# Patient Record
Sex: FEMALE | Race: WHITE | NOT HISPANIC OR LATINO | Employment: OTHER | URBAN - METROPOLITAN AREA
[De-identification: names, ages, dates, MRNs, and addresses within clinical notes are randomized per-mention and may not be internally consistent; named-entity substitution may affect disease eponyms.]

---

## 2020-11-12 ENCOUNTER — OFFICE VISIT (OUTPATIENT)
Dept: ORTHOPEDIC SURGERY | Facility: CLINIC | Age: 65
End: 2020-11-12

## 2020-11-12 VITALS — HEIGHT: 66 IN | BODY MASS INDEX: 21.86 KG/M2 | TEMPERATURE: 96 F | WEIGHT: 136 LBS

## 2020-11-12 DIAGNOSIS — M25.561 PAIN IN BOTH KNEES, UNSPECIFIED CHRONICITY: Primary | ICD-10-CM

## 2020-11-12 DIAGNOSIS — M25.562 PAIN IN BOTH KNEES, UNSPECIFIED CHRONICITY: Primary | ICD-10-CM

## 2020-11-12 DIAGNOSIS — M17.0 PRIMARY OSTEOARTHRITIS OF BOTH KNEES: ICD-10-CM

## 2020-11-12 PROCEDURE — 99204 OFFICE O/P NEW MOD 45 MIN: CPT | Performed by: ORTHOPAEDIC SURGERY

## 2020-11-12 PROCEDURE — 73562 X-RAY EXAM OF KNEE 3: CPT | Performed by: ORTHOPAEDIC SURGERY

## 2020-11-12 RX ORDER — ZOLPIDEM TARTRATE 10 MG/1
TABLET ORAL
COMMUNITY
Start: 2014-03-10

## 2020-11-12 RX ORDER — ESTRADIOL 0.5 MG/1
0.5 TABLET ORAL DAILY
COMMUNITY
Start: 2014-03-10

## 2020-11-12 RX ORDER — CHLORAL HYDRATE 500 MG
CAPSULE ORAL
COMMUNITY

## 2020-11-12 RX ORDER — LEVOTHYROXINE SODIUM 0.05 MG/1
TABLET ORAL DAILY
COMMUNITY
Start: 2014-03-10

## 2020-11-12 RX ORDER — CETIRIZINE HYDROCHLORIDE 5 MG/1
5 TABLET ORAL DAILY
COMMUNITY

## 2020-11-12 RX ORDER — CLOPIDOGREL BISULFATE 75 MG/1
TABLET ORAL
COMMUNITY
Start: 2014-03-10

## 2020-11-12 NOTE — PROGRESS NOTES
"willowPatient: Kathryn Saxena  YOB: 1955 65 y.o. female  Medical Record Number: [id number]    Chief Complaints:   Chief Complaint   Patient presents with   • Left Knee - Establish Care, Pain   • Right Knee - Establish Care, Pain       History of Present Illness:Kathryn Saxena is a 65 y.o. female who presents with bilateral knee pain severe in nature it has markedly worsened over the last few years.  She works as a  at [employer] and is very active.  She has become limited in any squatting or knee flexion activities.  She is having difficulty with stairs.  She used to run she is no longer able to run.  She has a severe anterior ache with any knee flexion.  Had injections a few weeks ago with some mild relief.  Cannot take anti-inflammatories due to Plavix.    Allergies: No Known Allergies    Medications:   Current Outpatient Medications   Medication Sig Dispense Refill   • cetirizine (zyrTEC) 5 MG tablet Take 5 mg by mouth Daily.     • estradiol (ESTRACE) 0.5 MG tablet Take 0.5 mg by mouth Daily.     • levothyroxine (Synthroid) 50 MCG tablet Take  by mouth Daily.     • zolpidem (Ambien) 10 MG tablet Take  by mouth.     • clopidogrel (Plavix) 75 MG tablet Take  by mouth.     • Omega-3 Fatty Acids (fish oil) 1000 MG capsule capsule Take  by mouth.       No current facility-administered medications for this visit.          The following portions of the patient's history were reviewed and updated as appropriate: allergies, current medications, past family history, past medical history, past social history, past surgical history and problem list.    Review of Systems:   A 14 point review of systems was performed. All systems negative except pertinent positives/negative listed in HPI above    Physical Exam:   Vitals:    11/12/20 0822   Temp: 96 °F (35.6 °C)   TempSrc: Temporal   Weight: 61.7 kg (136 lb)   Height: 167.6 cm (66\")       General: A and O x 3, ASA, NAD    SCLERA:    Normal    DENTITION:   " Normal  Knee:  bilateral    ALIGNMENT:     Varus  ,   Patella  tracks  Midline to lateral with crepitance    GAIT:    Antalgic    SKIN:    No abnormality    RANGE OF MOTION:   2  -  125   DEG    STRENGTH:   4+  / 5    LIGAMENTS:    No varus / valgus instability.   Negative  Lachman.    MENISCUS:     Negative   Eben       DISTAL PULSES:    Paplable    DISTAL SENSATION :   Intact    LYMPHATICS:     No   lymphadenopathy    OTHER:          - Positive  Trace  effusion      - Crepitance with ROM          Radiology:  Xrays 3views both knees (ap,lateral, sunrise) were ordered and reviewed for evaluation of knee pain demonstrating advanced varus osteoarthritis with bone on bone articulation, subchondral cysts, and periarticular osteophytes. There are no previous films for comparision.    Assessment/Plan: Bilateral knee advanced end-stage patellofemoral osteoarthritis.  We discussed joint replacement surgery.  I explained there are some limitations in active person like her self.  I would not expect her to be running jumping kneeling or squatting on a regular basis.  Continuation of conservative management vs. TKA discussed.  The patient wishes to proceed with total knee replacement.  At this point the patient has failed the full compliment of conservative treatment and stating complete understanding of the risks/benefits/ anternatives wishes to proceed with surgical treatment.    Risk and benefits of surgery were reviewed.  Including, but not limited to, blood clots or pulmonary embolism, anesthesia risk, infection, fracture, skin/leg numbness, persistent pain/crepitance/popping/catching, failure of the implant, need for future surgeries, hematoma, possible nerve or blood vessel injury, need for transfusion, and potential risk of stroke,heart attack or death, among others.  The patient understands and wishes to proceed.     It was explained that if tissue has been repaired or reconstructed, there is also an increased  chance of failure which may require further management.  Following the completion of the discussion, the patient expressed understanding of this planned course of care, all their questions were answered and consent will be obtained preoperatively.    Operative Plan: left  [facility] Total Knee Replacement an overnight staywith home health rehab-she will call to schedule        Shailesh Wheatley MD  11/12/2020

## 2025-06-13 ENCOUNTER — OFFICE VISIT (OUTPATIENT)
Dept: ORTHOPEDIC SURGERY | Facility: CLINIC | Age: 70
End: 2025-06-13
Payer: MEDICARE

## 2025-06-13 VITALS — WEIGHT: 133.2 LBS | TEMPERATURE: 98.2 F | BODY MASS INDEX: 21.41 KG/M2 | HEIGHT: 66 IN

## 2025-06-13 DIAGNOSIS — R52 PAIN: Primary | ICD-10-CM

## 2025-06-13 DIAGNOSIS — M17.11 PRIMARY OSTEOARTHRITIS OF RIGHT KNEE: ICD-10-CM

## 2025-06-13 RX ORDER — METHYLPREDNISOLONE ACETATE 80 MG/ML
80 INJECTION, SUSPENSION INTRA-ARTICULAR; INTRALESIONAL; INTRAMUSCULAR; SOFT TISSUE
Status: COMPLETED | OUTPATIENT
Start: 2025-06-13 | End: 2025-06-13

## 2025-06-13 RX ORDER — LIDOCAINE HYDROCHLORIDE 10 MG/ML
5 INJECTION, SOLUTION EPIDURAL; INFILTRATION; INTRACAUDAL; PERINEURAL
Status: COMPLETED | OUTPATIENT
Start: 2025-06-13 | End: 2025-06-13

## 2025-06-13 RX ORDER — MINOXIDIL 2.5 MG/1
2.5 TABLET ORAL DAILY
COMMUNITY
Start: 2025-05-05 | End: 2025-08-03

## 2025-06-13 RX ADMIN — LIDOCAINE HYDROCHLORIDE 5 ML: 10 INJECTION, SOLUTION EPIDURAL; INFILTRATION; INTRACAUDAL; PERINEURAL at 11:51

## 2025-06-13 RX ADMIN — METHYLPREDNISOLONE ACETATE 80 MG: 80 INJECTION, SUSPENSION INTRA-ARTICULAR; INTRALESIONAL; INTRAMUSCULAR; SOFT TISSUE at 11:51

## 2025-06-13 NOTE — PROGRESS NOTES
Patient: Shelly Saxena  YOB: 1955 69 y.o. female  Medical Record Number: [id number]    Chief Complaints:   Chief Complaint   Patient presents with    Right Knee - Establish Care, Initial Evaluation, Pain       History of Present Illness:Shelly Saxena is a 69 y.o. female who presents for follow-up of right knee pain that is chronic in nature.  Patient previously saw Dr. Wheatley in November 2020.  Patient was diagnosed with advanced knee osteoarthritis and was given an intra-articular joint injection and told her that she would eventually need an arthroplasty intervention.  Patient states that she has been can doing conservative measures ever since.  She states recently her symptoms began flaring up shortly after the [place].  Pain note noted globally to the anterior portion of the knee worse with full extension and deep flexion.  She denies any recent fall injury or trauma.  She does state that her knee will occasionally pop/click.  Denies any instability issues where her knee wants to buckle.  She is ambulating full weightbearing walks unassisted in clinic today.    Allergies:   Allergies   Allergen Reactions    Penicillins Rash       Medications:   Current Outpatient Medications   Medication Sig Dispense Refill    cetirizine (zyrTEC) 5 MG tablet Take 1 tablet by mouth Daily.      estradiol (ESTRACE) 0.5 MG tablet Take 1 tablet by mouth Daily.      levothyroxine (Synthroid) 50 MCG tablet Take  by mouth Daily.      minoxidil (LONITEN) 2.5 MG tablet Take 1 tablet by mouth Daily.      zolpidem (Ambien) 10 MG tablet Take  by mouth.       No current facility-administered medications for this visit.         The following portions of the patient's history were reviewed and updated as appropriate: allergies, current medications, past family history, past medical history, past social history, past surgical history and problem list.    Review of Systems:   Pertinent positives/negative listed in HPI  "above    Physical Exam:   Vitals:    06/13/25 0903   Temp: 98.2 °F (36.8 °C)   TempSrc: Temporal   Weight: 60.4 kg (133 lb 3.2 oz)   Height: 167.6 cm (65.98\")   PainSc: 5    PainLoc: Knee       General: A and O x 3, ASA, NAD      Knee Exam List: Knee:  right    ALIGNMENT:     Valgus      GAIT:    Antalgic    SKIN:    No abnormality    RANGE OF MOTION:   5  -  105   DEG    STRENGTH:   4  / 5    LIGAMENTS:    No varus / valgus instability.   Negative  Lachman.    MENISCUS:     Negative   Eben       DISTAL PULSES:    Paplable    DISTAL SENSATION :   Intact    LYMPHATICS:     No   lymphadenopathy    OTHER:          - Positive   effusion      - Crepitance with ROM     Radiology:  Xrays 3views right knee (ap,lateral, sunrise) were ordered and reviewed for evaluation of knee pain demonstrating advanced valgus and patellofemoral osteoarthritis with bone on bone articulation, subchondral cysts, and periarticular osteophytes.  Today's x-ray were compared with previous films which show further arthritic progression.      Assessment/Plan: Primary osteoarthritis right knee    Treatment option as well as imaging results were discussed in detail with the patient.  At this point in time patient would still like to proceed forward with conservative measures.  I have instructed her to continue on with quad strengthening and range of motion exercises.  I did educate her on the RICE method to help with inflammation and swelling.  Patient has responded well to previous intra-articular joint injection which will give her another 1 today as well.  She can follow back up with us as needed.    Large Joint Arthrocentesis: R knee  Date/Time: 6/13/2025 11:51 AM  Consent given by: patient  Site marked: site marked  Timeout: Immediately prior to procedure a time out was called to verify the correct patient, procedure, equipment, support staff and site/side marked as required   Supporting Documentation  Indications: pain and joint swelling "   Procedure Details  Location: knee - R knee  Preparation: Patient was prepped and draped in the usual sterile fashion  Needle size: 22 G  Approach: anterolateral  Medications administered: 80 mg methylPREDNISolone acetate 80 MG/ML; 5 mL lidocaine PF 1% 1 %  Patient tolerance: patient tolerated the procedure well with no immediate complications    (3 mL of lidocaine was used for anesthetic purposes)    MAKAYLA Wells  6/13/2025

## 2025-06-25 ENCOUNTER — TELEPHONE (OUTPATIENT)
Dept: ORTHOPEDIC SURGERY | Facility: CLINIC | Age: 70
End: 2025-06-25
Payer: MEDICARE

## 2025-06-30 ENCOUNTER — TELEPHONE (OUTPATIENT)
Dept: ORTHOPEDIC SURGERY | Facility: CLINIC | Age: 70
End: 2025-06-30

## 2025-06-30 ENCOUNTER — PATIENT MESSAGE (OUTPATIENT)
Dept: ORTHOPEDIC SURGERY | Facility: CLINIC | Age: 70
End: 2025-06-30
Payer: MEDICARE

## 2025-06-30 NOTE — TELEPHONE ENCOUNTER
Provider: RYAN ARREOLA    Caller: Shelly Saxena    Relationship to Patient: Self    Phone Number: [phone]    Reason for Call:PATIENT CALLED WANTING TO GIVE CONSENT FOR DR. KELL CAGE TO  X-RAY DISC ON HER BEHALF TODAY. ATTEMPTED TO REACH OFFICE, UNABLE TO WT. PLEASE ADVISE.

## 2025-07-01 ENCOUNTER — APPOINTMENT (OUTPATIENT)
Dept: GENERAL RADIOLOGY | Facility: HOSPITAL | Age: 70
End: 2025-07-01
Payer: MEDICARE

## 2025-07-01 ENCOUNTER — HOSPITAL ENCOUNTER (EMERGENCY)
Facility: HOSPITAL | Age: 70
Discharge: OTHER INSTITUTION | End: 2025-07-01
Attending: EMERGENCY MEDICINE | Admitting: EMERGENCY MEDICINE
Payer: MEDICARE

## 2025-07-01 VITALS
SYSTOLIC BLOOD PRESSURE: 112 MMHG | TEMPERATURE: 98.9 F | DIASTOLIC BLOOD PRESSURE: 59 MMHG | RESPIRATION RATE: 16 BRPM | OXYGEN SATURATION: 97 % | HEART RATE: 90 BPM

## 2025-07-01 DIAGNOSIS — M79.89 SWELLING OF RIGHT HAND: ICD-10-CM

## 2025-07-01 DIAGNOSIS — L03.113 CELLULITIS OF RIGHT ARM: Primary | ICD-10-CM

## 2025-07-01 LAB
ANION GAP SERPL CALCULATED.3IONS-SCNC: 16 MMOL/L (ref 5–15)
APTT PPP: 25.2 SECONDS (ref 22.7–35.4)
BASOPHILS # BLD AUTO: 0.03 10*3/MM3 (ref 0–0.2)
BASOPHILS NFR BLD AUTO: 0.2 % (ref 0–1.5)
BUN SERPL-MCNC: 22 MG/DL (ref 8–23)
BUN/CREAT SERPL: 20.4 (ref 7–25)
CALCIUM SPEC-SCNC: 9.2 MG/DL (ref 8.6–10.5)
CHLORIDE SERPL-SCNC: 98 MMOL/L (ref 98–107)
CK SERPL-CCNC: 143 U/L (ref 20–180)
CO2 SERPL-SCNC: 18 MMOL/L (ref 22–29)
CREAT SERPL-MCNC: 1.08 MG/DL (ref 0.57–1)
D-LACTATE SERPL-SCNC: 2.1 MMOL/L (ref 0.5–2)
DEPRECATED RDW RBC AUTO: 39.7 FL (ref 37–54)
EGFRCR SERPLBLD CKD-EPI 2021: 55.4 ML/MIN/1.73
EOSINOPHIL # BLD AUTO: 0 10*3/MM3 (ref 0–0.4)
EOSINOPHIL NFR BLD AUTO: 0 % (ref 0.3–6.2)
ERYTHROCYTE [DISTWIDTH] IN BLOOD BY AUTOMATED COUNT: 12.2 % (ref 12.3–15.4)
GLUCOSE SERPL-MCNC: 103 MG/DL (ref 65–99)
HCT VFR BLD AUTO: 43.5 % (ref 34–46.6)
HGB BLD-MCNC: 14.5 G/DL (ref 12–15.9)
IMM GRANULOCYTES # BLD AUTO: 0.14 10*3/MM3 (ref 0–0.05)
IMM GRANULOCYTES NFR BLD AUTO: 1 % (ref 0–0.5)
INR PPP: 1.04 (ref 0.9–1.1)
LYMPHOCYTES # BLD AUTO: 0.2 10*3/MM3 (ref 0.7–3.1)
LYMPHOCYTES NFR BLD AUTO: 1.4 % (ref 19.6–45.3)
MCH RBC QN AUTO: 29.7 PG (ref 26.6–33)
MCHC RBC AUTO-ENTMCNC: 33.3 G/DL (ref 31.5–35.7)
MCV RBC AUTO: 89 FL (ref 79–97)
MONOCYTES # BLD AUTO: 0.67 10*3/MM3 (ref 0.1–0.9)
MONOCYTES NFR BLD AUTO: 4.7 % (ref 5–12)
NEUTROPHILS NFR BLD AUTO: 13.36 10*3/MM3 (ref 1.7–7)
NEUTROPHILS NFR BLD AUTO: 92.7 % (ref 42.7–76)
NRBC BLD AUTO-RTO: 0 /100 WBC (ref 0–0.2)
PLATELET # BLD AUTO: 225 10*3/MM3 (ref 140–450)
PMV BLD AUTO: 10.1 FL (ref 6–12)
POTASSIUM SERPL-SCNC: 4 MMOL/L (ref 3.5–5.2)
PROCALCITONIN SERPL-MCNC: 10.4 NG/ML (ref 0–0.25)
PROTHROMBIN TIME: 13.5 SECONDS (ref 11.7–14.2)
RBC # BLD AUTO: 4.89 10*6/MM3 (ref 3.77–5.28)
SODIUM SERPL-SCNC: 132 MMOL/L (ref 136–145)
WBC NRBC COR # BLD AUTO: 14.4 10*3/MM3 (ref 3.4–10.8)

## 2025-07-01 PROCEDURE — 85025 COMPLETE CBC W/AUTO DIFF WBC: CPT | Performed by: EMERGENCY MEDICINE

## 2025-07-01 PROCEDURE — 96365 THER/PROPH/DIAG IV INF INIT: CPT

## 2025-07-01 PROCEDURE — 82550 ASSAY OF CK (CPK): CPT | Performed by: EMERGENCY MEDICINE

## 2025-07-01 PROCEDURE — 25010000002 CEFTRIAXONE PER 250 MG: Performed by: EMERGENCY MEDICINE

## 2025-07-01 PROCEDURE — 25010000002 FENTANYL CITRATE (PF) 50 MCG/ML SOLUTION: Performed by: EMERGENCY MEDICINE

## 2025-07-01 PROCEDURE — 25010000002 ONDANSETRON PER 1 MG: Performed by: EMERGENCY MEDICINE

## 2025-07-01 PROCEDURE — 96375 TX/PRO/DX INJ NEW DRUG ADDON: CPT

## 2025-07-01 PROCEDURE — 36415 COLL VENOUS BLD VENIPUNCTURE: CPT

## 2025-07-01 PROCEDURE — 99285 EMERGENCY DEPT VISIT HI MDM: CPT

## 2025-07-01 PROCEDURE — 85610 PROTHROMBIN TIME: CPT | Performed by: EMERGENCY MEDICINE

## 2025-07-01 PROCEDURE — 83605 ASSAY OF LACTIC ACID: CPT | Performed by: EMERGENCY MEDICINE

## 2025-07-01 PROCEDURE — 80048 BASIC METABOLIC PNL TOTAL CA: CPT | Performed by: EMERGENCY MEDICINE

## 2025-07-01 PROCEDURE — 36415 COLL VENOUS BLD VENIPUNCTURE: CPT | Performed by: EMERGENCY MEDICINE

## 2025-07-01 PROCEDURE — 25010000002 LINEZOLID 600 MG/300ML SOLUTION: Performed by: EMERGENCY MEDICINE

## 2025-07-01 PROCEDURE — 84145 PROCALCITONIN (PCT): CPT | Performed by: EMERGENCY MEDICINE

## 2025-07-01 PROCEDURE — 87040 BLOOD CULTURE FOR BACTERIA: CPT | Performed by: EMERGENCY MEDICINE

## 2025-07-01 PROCEDURE — 73130 X-RAY EXAM OF HAND: CPT

## 2025-07-01 PROCEDURE — 85730 THROMBOPLASTIN TIME PARTIAL: CPT | Performed by: EMERGENCY MEDICINE

## 2025-07-01 PROCEDURE — 25010000002 HYDROMORPHONE PER 4 MG: Performed by: EMERGENCY MEDICINE

## 2025-07-01 RX ORDER — ONDANSETRON 2 MG/ML
4 INJECTION INTRAMUSCULAR; INTRAVENOUS ONCE
Status: COMPLETED | OUTPATIENT
Start: 2025-07-01 | End: 2025-07-01

## 2025-07-01 RX ORDER — HYDROMORPHONE HYDROCHLORIDE 1 MG/ML
0.5 INJECTION, SOLUTION INTRAMUSCULAR; INTRAVENOUS; SUBCUTANEOUS ONCE
Refills: 0 | Status: COMPLETED | OUTPATIENT
Start: 2025-07-01 | End: 2025-07-01

## 2025-07-01 RX ORDER — FENTANYL CITRATE 50 UG/ML
100 INJECTION, SOLUTION INTRAMUSCULAR; INTRAVENOUS ONCE
Refills: 0 | Status: COMPLETED | OUTPATIENT
Start: 2025-07-01 | End: 2025-07-01

## 2025-07-01 RX ORDER — SODIUM CHLORIDE 0.9 % (FLUSH) 0.9 %
10 SYRINGE (ML) INJECTION AS NEEDED
Status: DISCONTINUED | OUTPATIENT
Start: 2025-07-01 | End: 2025-07-01 | Stop reason: HOSPADM

## 2025-07-01 RX ORDER — LINEZOLID 2 MG/ML
600 INJECTION, SOLUTION INTRAVENOUS ONCE
Status: COMPLETED | OUTPATIENT
Start: 2025-07-01 | End: 2025-07-01

## 2025-07-01 RX ADMIN — ONDANSETRON 4 MG: 2 INJECTION, SOLUTION INTRAMUSCULAR; INTRAVENOUS at 07:46

## 2025-07-01 RX ADMIN — LINEZOLID 600 MG: 600 INJECTION, SOLUTION INTRAVENOUS at 10:08

## 2025-07-01 RX ADMIN — CEFTRIAXONE SODIUM 2000 MG: 2 INJECTION, POWDER, FOR SOLUTION INTRAMUSCULAR; INTRAVENOUS at 08:58

## 2025-07-01 RX ADMIN — HYDROMORPHONE HYDROCHLORIDE 0.5 MG: 1 INJECTION, SOLUTION INTRAMUSCULAR; INTRAVENOUS; SUBCUTANEOUS at 10:08

## 2025-07-01 RX ADMIN — FENTANYL CITRATE 100 MCG: 50 INJECTION, SOLUTION INTRAMUSCULAR; INTRAVENOUS at 07:46

## 2025-07-01 NOTE — CASE MANAGEMENT/SOCIAL WORK
Discharge Planning Assessment  [facility]     Patient Name: Shelly Saxena  MRN: [id number]  Today's Date: 7/1/2025    Admit Date: 7/1/2025        Discharge Needs Assessment    No documentation.                  Discharge Plan       Row Name 07/01/25 1005       Plan    Plan Comments Scheduled Stat EMS transfer to [facility] to the OR per ERMD request. Updated primary RN                  Continued Care and Services - Admitted Since 7/1/2025    No active coordination exists.          Demographic Summary    No documentation.                  Functional Status    No documentation.                  Psychosocial    No documentation.                  Abuse/Neglect    No documentation.                  Legal    No documentation.                  Substance Abuse    No documentation.                  Patient Forms    No documentation.                     Katharina Spann RN

## 2025-07-01 NOTE — ED PROVIDER NOTES
EMERGENCY DEPARTMENT ENCOUNTER  Room Number:  12/12  PCP: Allison Werner MD  Independent Historians: Patient      HPI:  Chief Complaint: Right hand and forearm pain    A complete HPI/ROS/PMH/PSH/SH/FH are unobtainable due to: EM_Unobtainable History : None    Chronic or social conditions impacting patient care (Social Determinants of Health): None      Context: Shelly Saxena is a 69 y.o. female with a medical history of hypothyroidism who presents to the ED c/o acute right hand pain.  Patient reports she had a normal day yesterday.  She was walking her dog and when she got back and took the leash off of her hand she noticed that he had pinched at the base of her right thumb in the webspace.  She did not think much of it.  During the night increased swelling of the right hand developed affecting the thumb and dorsum of the hand but spread to the palm as well.  Increasing discomfort so she called family member to bring her this morning.  No reported fevers.  She noted erythema developing of the right hand with some redness and pain coursing up her forearm this morning.  She reports her mouth is dry but otherwise she feels well other than the pain.  Lesion noted from trauma last night.  Patient denies new medications recently      Review of prior external notes (non-ED) -and- Review of prior external test results outside of this encounter:  PCP office note 6/17/2025 reviewed: Patient seen and evaluated for arthralgia and diagnosed with arthritis      PAST MEDICAL HISTORY  Active Ambulatory Problems     Diagnosis Date Noted    No Active Ambulatory Problems     Resolved Ambulatory Problems     Diagnosis Date Noted    No Resolved Ambulatory Problems     Past Medical History:   Diagnosis Date    Hypothyroid     Kidney stones          PAST SURGICAL HISTORY  Past Surgical History:   Procedure Laterality Date    APPENDECTOMY      FOOT SURGERY      HIP SURGERY Left 2010    HYSTERECTOMY           FAMILY HISTORY  No  family history on file.      SOCIAL HISTORY  Social History     Socioeconomic History    Marital status: Unknown   Tobacco Use    Smoking status: Never    Smokeless tobacco: Never   Vaping Use    Vaping status: Never Used   Substance and Sexual Activity    Drug use: Defer    Sexual activity: Defer         ALLERGIES  Penicillins      REVIEW OF SYSTEMS  Review of Systems  Included in HPI  All systems reviewed and negative except for those discussed in HPI.      PHYSICAL EXAM    I have reviewed the triage vital signs and nursing notes.    ED Triage Vitals   Temp Heart Rate Resp BP SpO2   07/01/25 0704 07/01/25 0705 07/01/25 0704 -- 07/01/25 0705   98.6 °F (37 °C) 90 18  100 %      Temp src Heart Rate Source Patient Position BP Location FiO2 (%)   -- -- -- -- --              Physical Exam    Physical Exam   Constitutional: Uncomfortable appearing  HENT:  Head: Normocephalic and atraumatic.   Oropharynx: Xerostomia  Eyes: . No scleral icterus. No conjunctival pallor.  Neck: Normal range of motion. Neck supple.   Cardiovascular: Pink warm and well perfused throughout.    Pulmonary/Chest: No respiratory distress.  No tachypnea or increased work of breathing appreciated.     Musculoskeletal: There is swelling and tenderness to the radial side of the right hand on the dorsum and palmar surface with no crepitance.  There is ecchymosis and a bluish hue to the distal phalanx of the right first digit.  Capillary refill in the 2nd through 5th digits is normal.  Some discomfort with range of motion of the hand but it is intact.  There is some red streaking proximal to the wrist into the right antecubital fossa.  Again no crepitance is noted.  No open laceration to the right hand is identified.  Neurological: Alert and oriented.  No acute focal deficit appreciated.  Skin: Skin is pink, warm, and dry.   Psychiatric: Mood and affect normal.   Nursing note and vitals reviewed.             LAB RESULTS  Recent Results (from the past 24  hours)   Basic Metabolic Panel    Collection Time: 07/01/25  7:48 AM    Specimen: Arm, Left; Blood   Result Value Ref Range    Glucose 103 (H) 65 - 99 mg/dL    BUN 22.0 8.0 - 23.0 mg/dL    Creatinine 1.08 (H) 0.57 - 1.00 mg/dL    Sodium 132 (L) 136 - 145 mmol/L    Potassium 4.0 3.5 - 5.2 mmol/L    Chloride 98 98 - 107 mmol/L    CO2 18.0 (L) 22.0 - 29.0 mmol/L    Calcium 9.2 8.6 - 10.5 mg/dL    BUN/Creatinine Ratio 20.4 7.0 - 25.0    Anion Gap 16.0 (H) 5.0 - 15.0 mmol/L    eGFR 55.4 (L) >60.0 mL/min/1.73   Protime-INR    Collection Time: 07/01/25  7:48 AM    Specimen: Arm, Left; Blood   Result Value Ref Range    Protime 13.5 11.7 - 14.2 Seconds    INR 1.04 0.90 - 1.10   aPTT    Collection Time: 07/01/25  7:48 AM    Specimen: Arm, Left; Blood   Result Value Ref Range    PTT 25.2 22.7 - 35.4 seconds   CK    Collection Time: 07/01/25  7:48 AM    Specimen: Arm, Left; Blood   Result Value Ref Range    Creatine Kinase 143 20 - 180 U/L   CBC Auto Differential    Collection Time: 07/01/25  7:48 AM    Specimen: Arm, Left; Blood   Result Value Ref Range    WBC 14.40 (H) 3.40 - 10.80 10*3/mm3    RBC 4.89 3.77 - 5.28 10*6/mm3    Hemoglobin 14.5 12.0 - 15.9 g/dL    Hematocrit 43.5 34.0 - 46.6 %    MCV 89.0 79.0 - 97.0 fL    MCH 29.7 26.6 - 33.0 pg    MCHC 33.3 31.5 - 35.7 g/dL    RDW 12.2 (L) 12.3 - 15.4 %    RDW-SD 39.7 37.0 - 54.0 fl    MPV 10.1 6.0 - 12.0 fL    Platelets 225 140 - 450 10*3/mm3    Neutrophil % 92.7 (H) 42.7 - 76.0 %    Lymphocyte % 1.4 (L) 19.6 - 45.3 %    Monocyte % 4.7 (L) 5.0 - 12.0 %    Eosinophil % 0.0 (L) 0.3 - 6.2 %    Basophil % 0.2 0.0 - 1.5 %    Immature Grans % 1.0 (H) 0.0 - 0.5 %    Neutrophils, Absolute 13.36 (H) 1.70 - 7.00 10*3/mm3    Lymphocytes, Absolute 0.20 (L) 0.70 - 3.10 10*3/mm3    Monocytes, Absolute 0.67 0.10 - 0.90 10*3/mm3    Eosinophils, Absolute 0.00 0.00 - 0.40 10*3/mm3    Basophils, Absolute 0.03 0.00 - 0.20 10*3/mm3    Immature Grans, Absolute 0.14 (H) 0.00 - 0.05 10*3/mm3    nRBC  0.0 0.0 - 0.2 /100 WBC   Lactic Acid, Plasma    Collection Time: 07/01/25  7:48 AM    Specimen: Arm, Left; Blood   Result Value Ref Range    Lactate 2.1 (C) 0.5 - 2.0 mmol/L   Procalcitonin    Collection Time: 07/01/25  7:48 AM    Specimen: Arm, Left; Blood   Result Value Ref Range    Procalcitonin 10.40 (H) 0.00 - 0.25 ng/mL         RADIOLOGY  XR Hand 3+ View Right  Result Date: 7/1/2025  XR HAND 3+ VW RIGHT-  INDICATIONS: Swelling  TECHNIQUE: 3 VIEWS OF THE RIGHT HAND  COMPARISON: None available  FINDINGS:  Soft tissue swelling is conspicuous in the hand, could for example reflect inflammation, infection, injury, venous insufficiency, correlate clinically. No acute fracture, erosion, or dislocation is identified. Follow-up/further evaluation can be obtained as indications persist.       As described.    This report was finalized on 7/1/2025 8:26 AM by Dr. Pollo Lopes M.D on Workstation: [id number]          MEDICATIONS GIVEN IN ER  Medications   sodium chloride 0.9 % flush 10 mL (has no administration in time range)   HYDROmorphone (DILAUDID) injection 0.5 mg (has no administration in time range)   Linezolid (ZYVOX) 600 mg 300 mL (has no administration in time range)   cefTRIAXone (ROCEPHIN) 2,000 mg in sodium chloride 0.9 % 100 mL MBP (0 mg Intravenous Stopped 7/1/25 0935)   fentaNYL citrate (PF) (SUBLIMAZE) injection 100 mcg (100 mcg Intravenous Given 7/1/25 0346)   ondansetron (ZOFRAN) injection 4 mg (4 mg Intravenous Given 7/1/25 0746)         ORDERS PLACED DURING THIS VISIT:  Orders Placed This Encounter   Procedures    Blood Culture - Blood,    Blood Culture - Blood,    XR Hand 3+ View Right    Basic Metabolic Panel    Protime-INR    aPTT    CK    CBC Auto Differential    Lactic Acid, Plasma    Procalcitonin    STAT Lactic Acid, Reflex    Monitor Blood Pressure    Insert Peripheral IV    CBC & Differential         OUTPATIENT MEDICATION MANAGEMENT:  Current Facility-Administered Medications Ordered in Epic    Medication Dose Route Frequency Provider Last Rate Last Admin    HYDROmorphone (DILAUDID) injection 0.5 mg  0.5 mg Intravenous Once Robert Shelton MD        Linezolid (ZYVOX) 600 mg 300 mL  600 mg Intravenous Once Robert Shelton MD        sodium chloride 0.9 % flush 10 mL  10 mL Intravenous PRN Robert Shelton MD         Current Outpatient Medications Ordered in Epic   Medication Sig Dispense Refill    cetirizine (zyrTEC) 5 MG tablet Take 1 tablet by mouth Daily.      estradiol (ESTRACE) 0.5 MG tablet Take 1 tablet by mouth Daily.      levothyroxine (Synthroid) 50 MCG tablet Take  by mouth Daily.      minoxidil (LONITEN) 2.5 MG tablet Take 1 tablet by mouth Daily.      zolpidem (Ambien) 10 MG tablet Take  by mouth.           PROCEDURES  Procedures    Total critical care time: Approximately 45 minutes    Due to a high probability of clinically significant, life threatening deterioration, the patient required my highest level of preparedness to intervene emergently and I personally spent this critical care time directly and personally managing the patient. This critical care time included obtaining a history; examining the patient; vital sign monitoring; ordering and review of studies; arranging urgent treatment with development of a management plan; evaluation of patient's response to treatment; frequent reassessment; and, discussions with other providers.    This critical care time was performed to assess and manage the high probability of imminent, life-threatening deterioration that could result in multi-organ failure. It was exclusive of separately billable procedures and treating other patients and teaching time.    Please see MDM section and the rest of the note for further information on patient assessment and treatment.          PROGRESS, DATA ANALYSIS, CONSULTS, AND MEDICAL DECISION MAKING  All labs have been independently interpreted by me.  All radiology studies have been reviewed by me. All EKGs have  been independently viewed and interpreted by me.  Discussion below represents my analysis of pertinent findings related to patient's condition, differential diagnosis, treatment plan and final disposition.    Differential diagnosis:   My differential diagnosis of the upper extremity pain or injury includes but is not limited to contusions of the shoulder, forearm, arm, wrist, elbow or hand, dislocations of shoulder, elbow, wrist, digits, nursemaid's elbow (age-appropriate), shoulder sprain, elbow sprain, wrist sprain, digit sprain, shoulder strain, arm strain, forearm strain, elbow strain, wrist strain, hand sprain, digit strain, lacerations of the upper extremity, fractures both closed and open of clavicle, scapula, humerus, radius, ulna, bones of the wrist, hands and digits, cellulitis or abscess, cervical radiculopathy, radial nerve palsy, neurogenic upper extremity pain, angina equivalent, SVT, DVT, arterial occlusion, compartment syndrome.      Clinical Scores:                  ED Course as of 07/01/25 1004   Tue Jul 01, 2025   0730 Cover patient broadly with antibiotics for suspected cellulitis though source unclear.  Recommend transfer to facility with hand surgery available for consultation.  Patient would prefer [facility] if possible. [RS]   0857 Lactate(!!): 2.1 [RS]   0857 Procalcitonin(!): 10.40 [RS]   0857 WBC(!): 14.40 [RS]   0857 Hemoglobin: 14.5 [RS]   0857 RADIOLOGY      Study: Right hand-3 views  Findings: No fracture or radiopaque foreign body identified  I independently viewed and interpreted these images contemporaneously with treatment.    [RS]   0900 Initiate planning for transfer to facility with hand surgery availability.  [facility] transfer center contacted.  Await callback from hand surgery. [RS]   0901 Creatine Kinase: 143 [RS]   0901 Lactate(!!): 2.1 [RS]   0901 Procalcitonin(!): 10.40 [RS]   0901 Glucose(!): 103 [RS]   0901 BUN: 22.0 [RS]   0901 Creatinine(!): 1.08 [RS]   0901 Sodium(!): 132  [RS]   0901 Potassium: 4.0 [RS]   0901 WBC(!): 14.40 [RS]   0901 Hemoglobin: 14.5 [RS]   0901 Platelets: 225 [RS]   0901 Immature Grans, Absolute(!): 0.14 [RS]   0958 Late entry:  CONSULT        Provider: Dr. Leger - Hand surgery    Discussion: Reviewed patient history, ED presentation evaluation.  High-level concern for necrotizing fasciitis secondary to strep infection.  Request patient transferred to the OR at [facility] second-floor downtow for urgent operative intervention.  Also request addition of linezolid to therapies.    Agreeable c treatment and planned disposition.         [RS]   0959 I discussed this with [facility] transfer center who said the patient could be going and be transferred down to the OR preop area and gave me transfer information for the nurse to call report.  She will also try to contact me with medicine team for overall admission planning. [RS]   1000 Patient and family updated with critical finding.  Agreeable with plan for transfer. [RS]   1003 Charge nurse, case management, and patient's nurse updated with critical findings and need for emergent transfer. [RS]      ED Course User Index  [RS] Robert Shelton MD         Prescription drug monitoring program review:     AS OF 10:04 EDT VITALS:    BP - 108/64  HR - 86  TEMP - 98.6 °F (37 °C)  O2 SATS - 96%    COMPLEXITY OF CARE  The patient requires admission.      DIAGNOSIS  Final diagnoses:   Cellulitis of right arm   Swelling of right hand         DISPOSITION  ED Disposition       ED Disposition   Transfer to Another Facility     Condition   --    Comment   --                  Transfer to facility with hand surgery.    Please note that portions of this document were completed with a voice recognition program.    Note Disclaimer: At [facility], we believe that sharing information builds trust and better relationships. You are receiving this note because you recently visited [facility]. It is possible you will see health information  before a provider has talked with you about it. This kind of information can be easy to misunderstand. To help you fully understand what it means for your health, we urge you to discuss this note with your provider.         Robert Shelton MD  07/01/25 1006

## 2025-07-06 LAB
BACTERIA SPEC AEROBE CULT: NORMAL
BACTERIA SPEC AEROBE CULT: NORMAL